# Patient Record
Sex: FEMALE | Race: WHITE | NOT HISPANIC OR LATINO | Employment: FULL TIME | URBAN - METROPOLITAN AREA
[De-identification: names, ages, dates, MRNs, and addresses within clinical notes are randomized per-mention and may not be internally consistent; named-entity substitution may affect disease eponyms.]

---

## 2023-05-08 ENCOUNTER — HOSPITAL ENCOUNTER (OUTPATIENT)
Dept: DATA CONVERSION | Facility: HOSPITAL | Age: 28
End: 2023-05-08
Attending: SURGERY | Admitting: SURGERY
Payer: COMMERCIAL

## 2023-05-08 DIAGNOSIS — E66.9 OBESITY, UNSPECIFIED: ICD-10-CM

## 2023-05-08 DIAGNOSIS — F17.290 NICOTINE DEPENDENCE, OTHER TOBACCO PRODUCT, UNCOMPLICATED: ICD-10-CM

## 2023-05-08 DIAGNOSIS — K64.4 RESIDUAL HEMORRHOIDAL SKIN TAGS: ICD-10-CM

## 2023-05-08 DIAGNOSIS — K60.3 ANAL FISTULA: ICD-10-CM

## 2023-05-08 DIAGNOSIS — Z88.0 ALLERGY STATUS TO PENICILLIN: ICD-10-CM

## 2023-10-02 NOTE — OP NOTE
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 The patient was brought to the operating room and placed supine. Sequential compression devices were placed. Huddle was completed in accordance with hospital procedures.  Anesthesia was induced and LMA was placed. The patient was placed in lithotomy with all pressure points padded. The area was prepped and draped in the usual fashion. Time out was completed. Digital rectal exam was performed. Pudendal nerve block was performed  with 1/4% marcaine with epinephrine. Intersphincteric block  was also performed. A total of 30cc of local anesthesia was utilized.    The anal sphincter was normal. There were large external hemorrhoids on the right anterior and posterior positions. There were small skin tags on the left lateral position. The seton was in place on the right anterior. There was predominantly subcutaneous  tissue and a few fibers of the internal sphincter. These were divided with electrocautery and the seton removed. The edges were saucerized.     The patient was then returned to supine and awakened. The patient was transferred to PACU awake and in stable conditions. Counts were reported correct  at the end of the procedure. I was present and scrubbed throughout.                        Attestation:   Note Completion:  Attending Attestation I  was present for the entire procedure    I am a: Resident/Fellow         Electronic Signatures:  Stefani Aleman (Fellow))  (Signed 08-May-2023 13:20)   Authored: Post-Operative Note, Chart Review, Note Completion  Latosha Rosen)  (Signed 09-May-2023 13:07)   Authored: Post-Operative Note, Chart Review, Note Completion   Co-Signer: Post-Operative Note, Chart Review, Note Completion      Last Updated: 09-May-2023 13:07 by Latosha Rosen)

## 2023-10-12 ENCOUNTER — HOSPITAL ENCOUNTER (EMERGENCY)
Facility: HOSPITAL | Age: 28
Discharge: HOME | End: 2023-10-12
Attending: EMERGENCY MEDICINE
Payer: COMMERCIAL

## 2023-10-12 VITALS
BODY MASS INDEX: 33.66 KG/M2 | OXYGEN SATURATION: 97 % | DIASTOLIC BLOOD PRESSURE: 74 MMHG | WEIGHT: 190 LBS | SYSTOLIC BLOOD PRESSURE: 112 MMHG | HEART RATE: 81 BPM | HEIGHT: 63 IN | TEMPERATURE: 98.6 F | RESPIRATION RATE: 18 BRPM

## 2023-10-12 DIAGNOSIS — B34.9 VIRAL ILLNESS: ICD-10-CM

## 2023-10-12 DIAGNOSIS — R55 VASOVAGAL SYNCOPE: Primary | ICD-10-CM

## 2023-10-12 LAB
ALBUMIN SERPL BCP-MCNC: 4.6 G/DL (ref 3.4–5)
ALP SERPL-CCNC: 77 U/L (ref 33–110)
ALT SERPL W P-5'-P-CCNC: 21 U/L (ref 7–45)
ANION GAP BLDV CALCULATED.4IONS-SCNC: 6 MMOL/L (ref 10–25)
ANION GAP SERPL CALC-SCNC: 11 MMOL/L (ref 10–20)
APPEARANCE UR: ABNORMAL
AST SERPL W P-5'-P-CCNC: 20 U/L (ref 9–39)
BASE EXCESS BLDV CALC-SCNC: 2.4 MMOL/L (ref -2–3)
BASOPHILS # BLD AUTO: 0.06 X10*3/UL (ref 0–0.1)
BASOPHILS NFR BLD AUTO: 0.5 %
BILIRUB DIRECT SERPL-MCNC: 0.1 MG/DL (ref 0–0.3)
BILIRUB SERPL-MCNC: 0.5 MG/DL (ref 0–1.2)
BILIRUB UR STRIP.AUTO-MCNC: NEGATIVE MG/DL
BODY TEMPERATURE: 37 DEGREES CELSIUS
BUN SERPL-MCNC: 11 MG/DL (ref 6–23)
CA-I BLDV-SCNC: 1.22 MMOL/L (ref 1.1–1.33)
CALCIUM SERPL-MCNC: 8.9 MG/DL (ref 8.6–10.3)
CHLORIDE BLDV-SCNC: 105 MMOL/L (ref 98–107)
CHLORIDE SERPL-SCNC: 105 MMOL/L (ref 98–107)
CO2 SERPL-SCNC: 25 MMOL/L (ref 21–32)
COLOR UR: YELLOW
CREAT SERPL-MCNC: 0.77 MG/DL (ref 0.5–1.05)
EOSINOPHIL # BLD AUTO: 0.06 X10*3/UL (ref 0–0.7)
EOSINOPHIL NFR BLD AUTO: 0.5 %
ERYTHROCYTE [DISTWIDTH] IN BLOOD BY AUTOMATED COUNT: 13.2 % (ref 11.5–14.5)
GFR SERPL CREATININE-BSD FRML MDRD: >90 ML/MIN/1.73M*2
GLUCOSE BLDV-MCNC: 97 MG/DL (ref 74–99)
GLUCOSE SERPL-MCNC: 89 MG/DL (ref 74–99)
GLUCOSE UR STRIP.AUTO-MCNC: NEGATIVE MG/DL
HCG UR QL IA.RAPID: NEGATIVE
HCO3 BLDV-SCNC: 28.8 MMOL/L (ref 22–26)
HCT VFR BLD AUTO: 39.7 % (ref 36–46)
HCT VFR BLD EST: 41 % (ref 36–46)
HGB BLD-MCNC: 12.8 G/DL (ref 12–16)
HGB BLDV-MCNC: 13.5 G/DL (ref 12–16)
HOLD SPECIMEN: NORMAL
IMM GRANULOCYTES # BLD AUTO: 0.05 X10*3/UL (ref 0–0.7)
IMM GRANULOCYTES NFR BLD AUTO: 0.4 % (ref 0–0.9)
INHALED O2 CONCENTRATION: 0 %
KETONES UR STRIP.AUTO-MCNC: NEGATIVE MG/DL
LACTATE BLDV-SCNC: 0.7 MMOL/L (ref 0.4–2)
LEUKOCYTE ESTERASE UR QL STRIP.AUTO: NEGATIVE
LYMPHOCYTES # BLD AUTO: 1.11 X10*3/UL (ref 1.2–4.8)
LYMPHOCYTES NFR BLD AUTO: 9.9 %
MCH RBC QN AUTO: 28.7 PG (ref 26–34)
MCHC RBC AUTO-ENTMCNC: 32.2 G/DL (ref 32–36)
MCV RBC AUTO: 89 FL (ref 80–100)
MONOCYTES # BLD AUTO: 0.98 X10*3/UL (ref 0.1–1)
MONOCYTES NFR BLD AUTO: 8.7 %
NEUTROPHILS # BLD AUTO: 8.97 X10*3/UL (ref 1.2–7.7)
NEUTROPHILS NFR BLD AUTO: 80 %
NITRITE UR QL STRIP.AUTO: NEGATIVE
NRBC BLD-RTO: 0 /100 WBCS (ref 0–0)
OXYHGB MFR BLDV: 61 % (ref 45–75)
PCO2 BLDV: 51 MM HG (ref 41–51)
PH BLDV: 7.36 PH (ref 7.33–7.43)
PH UR STRIP.AUTO: 6 [PH]
PLATELET # BLD AUTO: 249 X10*3/UL (ref 150–450)
PMV BLD AUTO: 11.3 FL (ref 7.5–11.5)
PO2 BLDV: 38 MM HG (ref 35–45)
POTASSIUM BLDV-SCNC: 4.1 MMOL/L (ref 3.5–5.3)
POTASSIUM SERPL-SCNC: 3.9 MMOL/L (ref 3.5–5.3)
PROT SERPL-MCNC: 7.7 G/DL (ref 6.4–8.2)
PROT UR STRIP.AUTO-MCNC: NEGATIVE MG/DL
RBC # BLD AUTO: 4.46 X10*6/UL (ref 4–5.2)
RBC # UR STRIP.AUTO: NEGATIVE /UL
S PYO DNA THROAT QL NAA+PROBE: NOT DETECTED
SAO2 % BLDV: 62 % (ref 45–75)
SARS-COV-2 RNA RESP QL NAA+PROBE: NOT DETECTED
SODIUM BLDV-SCNC: 136 MMOL/L (ref 136–145)
SODIUM SERPL-SCNC: 137 MMOL/L (ref 136–145)
SP GR UR STRIP.AUTO: 1.02
UROBILINOGEN UR STRIP.AUTO-MCNC: <2 MG/DL
WBC # BLD AUTO: 11.2 X10*3/UL (ref 4.4–11.3)

## 2023-10-12 PROCEDURE — 84075 ASSAY ALKALINE PHOSPHATASE: CPT | Performed by: EMERGENCY MEDICINE

## 2023-10-12 PROCEDURE — 87651 STREP A DNA AMP PROBE: CPT | Performed by: EMERGENCY MEDICINE

## 2023-10-12 PROCEDURE — 36415 COLL VENOUS BLD VENIPUNCTURE: CPT | Performed by: EMERGENCY MEDICINE

## 2023-10-12 PROCEDURE — 84295 ASSAY OF SERUM SODIUM: CPT | Mod: 59 | Performed by: EMERGENCY MEDICINE

## 2023-10-12 PROCEDURE — 82435 ASSAY OF BLOOD CHLORIDE: CPT | Performed by: EMERGENCY MEDICINE

## 2023-10-12 PROCEDURE — 85025 COMPLETE CBC W/AUTO DIFF WBC: CPT | Performed by: EMERGENCY MEDICINE

## 2023-10-12 PROCEDURE — 82947 ASSAY GLUCOSE BLOOD QUANT: CPT | Performed by: EMERGENCY MEDICINE

## 2023-10-12 PROCEDURE — 96360 HYDRATION IV INFUSION INIT: CPT

## 2023-10-12 PROCEDURE — 81025 URINE PREGNANCY TEST: CPT | Performed by: EMERGENCY MEDICINE

## 2023-10-12 PROCEDURE — 82248 BILIRUBIN DIRECT: CPT | Performed by: EMERGENCY MEDICINE

## 2023-10-12 PROCEDURE — 99284 EMERGENCY DEPT VISIT MOD MDM: CPT | Performed by: EMERGENCY MEDICINE

## 2023-10-12 PROCEDURE — 81003 URINALYSIS AUTO W/O SCOPE: CPT | Performed by: EMERGENCY MEDICINE

## 2023-10-12 PROCEDURE — 96361 HYDRATE IV INFUSION ADD-ON: CPT

## 2023-10-12 PROCEDURE — 2580000001 HC RX 258 IV SOLUTIONS: Performed by: EMERGENCY MEDICINE

## 2023-10-12 PROCEDURE — 87635 SARS-COV-2 COVID-19 AMP PRB: CPT | Performed by: EMERGENCY MEDICINE

## 2023-10-12 RX ADMIN — SODIUM CHLORIDE, SODIUM LACTATE, POTASSIUM CHLORIDE, AND CALCIUM CHLORIDE 1000 ML: 600; 310; 30; 20 INJECTION, SOLUTION INTRAVENOUS at 15:55

## 2023-10-12 ASSESSMENT — COLUMBIA-SUICIDE SEVERITY RATING SCALE - C-SSRS
1. IN THE PAST MONTH, HAVE YOU WISHED YOU WERE DEAD OR WISHED YOU COULD GO TO SLEEP AND NOT WAKE UP?: NO
2. HAVE YOU ACTUALLY HAD ANY THOUGHTS OF KILLING YOURSELF?: NO
6. HAVE YOU EVER DONE ANYTHING, STARTED TO DO ANYTHING, OR PREPARED TO DO ANYTHING TO END YOUR LIFE?: NO

## 2023-10-12 NOTE — Clinical Note
Maria Esther Villegas was seen and treated in our emergency department on 10/12/2023.  She may return to work on 10/16/2023.       If you have any questions or concerns, please don't hesitate to call.      Danica Funk MD

## 2023-10-12 NOTE — ED PROVIDER NOTES
HPI   Chief Complaint   Patient presents with    Syncope    Flu Symptoms       HPI: []  Currently white female with no medical history comes in with syncope.  She said for last 48 hours she is been experiencing a viral illness with a sore throat stuffy nose runny nose cough congestion and generalized body send myalgias she went to urgent care/[facility] for evaluation strep was negative and while being evaluated while sitting on the chair she had a brief syncopal episode.  She did not fall down did not hit her head she had no incontinence no seizures.  Currently back to baseline.  Denies any chest pain pressure heaviness denies shortness breath no headache.  No vision changes.  No recent travel or hospitalization.  No trauma or falls.  She denies hematemesis melena or hematochezia.  Denies hemoptysis.  Denies chills or rigors.    Past history: None except for recent perianal abscess requiring I&D  Social: Ex tobacco use quit about 2 weeks ago denies a current tobacco alcohol drug abuse.  REVIEW OF SYSTEMS:    GENERAL.: No weight loss, fatigue, anorexia, insomnia, positive for fever.  Positive for myalgias    EYES: No vision loss, double vision, drainage, eye pain.    ENT: Positive for pharyngitis, dry mouth.    CARDIOPULMONARY: No chest pain, palpitations, syncope, near syncope. No shortness of breath, cough, hemoptysis.    GI: No abdominal pain, change in bowel habits, melena, hematemesis, hematochezia, nausea, vomiting, diarrhea.    : No discharge, dysuria, frequency, urgency, hematuria.    MS: No limb pain, joint pain, joint swelling.    SKIN: No rashes.    PSYCH: No depression, anxiety, suicidality, homicidality.    Review of systems is otherwise negative unless stated above or in history of present illness.  Social history, family history, allergies reviewed.  PHYSICAL EXAM:    GENERAL: Vitals noted, no distress. Alert and oriented  x 3. Non-toxic.      EENT: TMs clear. Posterior oropharynx with mild  hyperemia with no exerted uvula midline no PTA no meningismus. No LAD.     NECK: Supple. Nontender. No midline tenderness.     CARDIAC: Regular, rate, rhythm. No murmurs rubs or gallops. No JVD    PULMONARY: Lungs clear bilaterally with good aeration. No wheezes rales or rhonchi. No respiratory distress.  No tachypnea stridor or retractions able to speak in full sentences    ABDOMEN: Soft, nonsurgical. Nontender. No peritoneal signs. Normoactive bowel sounds. No pulsatile masses.  Negative CVA tenderness negative inguinal hernias    EXTREMITIES: No peripheral edema. Negative Homans bilaterally, no cords.  2+ bounding possible perfused    SKIN: No rash. Intact.     NEURO: No focal neurologic deficits, NIH score of 0. Cranial nerves normal as tested from II through XII.  Negative nuchal rigidity negative meningeal signs    MEDICAL DECISION MAKING:  EKG on my interpretation shows a normal sinus rhythm normal axis rate mid 80s with no acute ischemic change.    CBC today shows no leukocytosis chemistries unremarkable LFTs unremarkable UA negative strep negative covid19 negative.    Treatment in ED: Upon arrival IV established, given 1 L of LR.    ED course: Repeat assessment feeling much better remains afebrile normotensive no tachycardia or hypoxia.    Impression: #1 viral illness, #2 syncope most likely vasovagal    Plan/MDM: Young 28year-old female who has a medical history comes in with viral illness for 48 duration and a syncopal episode with patient with vasovagal at this time I suspicion for meningitis encephalitis TIA stroke STEMI NSTEMI or catastrophic malignant arrhythmia is low.  Patient appears well she is afebrile normotensive no negative meningeal signs very benign work-up in the ED.  I do not think she requires any further advanced imaging and or hospitalization.  Patient will be discharged home by supportive care close outpatient follow recommended with strict return precaution.                               No data recorded                Patient History   No past medical history on file.  No past surgical history on file.  No family history on file.  Social History     Tobacco Use    Smoking status: Not on file    Smokeless tobacco: Not on file   Substance Use Topics    Alcohol use: Not on file    Drug use: Not on file       Physical Exam   ED Triage Vitals [10/12/23 1542]   Temp Heart Rate Resp BP   37 °C (98.6 °F) 84 16 117/78      SpO2 Temp Source Heart Rate Source Patient Position   -- Oral Monitor --      BP Location FiO2 (%)     -- --       Physical Exam    ED Course & [facility]   ED Course as of 10/12/23 1909   Thu Oct 12, 2023   1909 On repeat assessment patient feeling remarkably better she is afebrile normotensive no tachycardia or hypoxia.  Nonfocal examination.  Negative meningeal signs.  Patient most likely is a viral illness and she had a vasovagal syncope.  Patient reassured advised close outpatient follow-up adequate hydration with strict return precautions. [MT]      ED Course User Index  [MT] Danica Funk MD         Diagnoses as of 10/12/23 1909   Vasovagal syncope   Viral illness       Medical Decision Making      Procedure  Procedures     Danica Funk MD  10/12/23 1912

## 2023-10-12 NOTE — ED TRIAGE NOTES
Pt was at urgent care for flu-like symptoms and while at Urgent care pt had a syncopal episode that lasted a min.

## 2025-05-04 ENCOUNTER — OFFICE VISIT (OUTPATIENT)
Dept: URGENT CARE | Age: 30
End: 2025-05-04
Payer: COMMERCIAL

## 2025-05-04 VITALS
TEMPERATURE: 98.4 F | SYSTOLIC BLOOD PRESSURE: 144 MMHG | BODY MASS INDEX: 40.18 KG/M2 | HEART RATE: 76 BPM | WEIGHT: 250 LBS | RESPIRATION RATE: 18 BRPM | HEIGHT: 66 IN | DIASTOLIC BLOOD PRESSURE: 76 MMHG | OXYGEN SATURATION: 98 %

## 2025-05-04 DIAGNOSIS — J01.90 ACUTE NON-RECURRENT SINUSITIS, UNSPECIFIED LOCATION: Primary | ICD-10-CM

## 2025-05-04 DIAGNOSIS — R68.89 FLU-LIKE SYMPTOMS: ICD-10-CM

## 2025-05-04 LAB
POC CORONAVIRUS SARS-COV-2 PCR: NEGATIVE
POC HUMAN RHINOVIRUS PCR: NEGATIVE
POC INFLUENZA A VIRUS PCR: NEGATIVE
POC INFLUENZA B VIRUS PCR: NEGATIVE
POC RESPIRATORY SYNCYTIAL VIRUS PCR: NEGATIVE

## 2025-05-04 PROCEDURE — 99213 OFFICE O/P EST LOW 20 MIN: CPT | Performed by: STUDENT IN AN ORGANIZED HEALTH CARE EDUCATION/TRAINING PROGRAM

## 2025-05-04 PROCEDURE — 1036F TOBACCO NON-USER: CPT | Performed by: STUDENT IN AN ORGANIZED HEALTH CARE EDUCATION/TRAINING PROGRAM

## 2025-05-04 PROCEDURE — 87631 RESP VIRUS 3-5 TARGETS: CPT | Performed by: STUDENT IN AN ORGANIZED HEALTH CARE EDUCATION/TRAINING PROGRAM

## 2025-05-04 PROCEDURE — 3008F BODY MASS INDEX DOCD: CPT | Performed by: STUDENT IN AN ORGANIZED HEALTH CARE EDUCATION/TRAINING PROGRAM

## 2025-05-04 RX ORDER — DOXYCYCLINE 100 MG/1
100 CAPSULE ORAL 2 TIMES DAILY
Qty: 14 CAPSULE | Refills: 0 | Status: SHIPPED | OUTPATIENT
Start: 2025-05-04 | End: 2025-05-11

## 2025-05-04 ASSESSMENT — ENCOUNTER SYMPTOMS
COUGH: 1
HEADACHES: 1

## 2025-05-04 NOTE — PATIENT INSTRUCTIONS
PROBIOTICS:  I recommend starting two different probiotics. Probiotics can help replenish beneficial gut bacteria that antibiotics often destroy, reducing the risk of digestive issues that antibiotics may cause such as diarrhea, bloating and C. difficle intestinal infection. You can typically find these at most [employer], [employer], [employer] or on [employer]  1) One containing Lactobacillus Rhamnosus GG. This can help reduce antibiotic associated diarrhea. Ex: Culturelle  2) One containing Saccharomyces Boulardii. This can help prevent antibiotic associated C.difficle intestinal infection. Ex: Florastor  Helpful tips:  *Take probiotic at least 2 hours before or after the antibiotic to prevent the antibiotic from killing the good bacteria  *Continue the probiotic for 1-2 weeks after finishing the antibiotic to help restore gut health  *Include probiotic rich foods like Greek yogurt, kefir, sauerkraut or kimchi for added benefits     sinus supportive care:     Mucinex D with pseudoephedrine can be helpful for congestion.     I advise rest, fluids, Flonase nasal spray once daily, nasal saline rinses 2-3 times daily, humidifier in bedroom at night, Claritin or Zyrtec once daily for the next two weeks to help with post nasal drip and/or feeling of fullness in the ears from congestion.    Dissolving 1 tsp of salt into warm water and gargling as needed can be helpful for sore throat. Honey can help to coat and soothe the throat as well. Honey is also an excellent cough suppressant. You can swallow 1 tsp honey plain or dissolve it into warm tea/liquid as needed.     Please follow up with your primary provider, go to the emergency room, or return to urgent care if symptoms do not improve or worsen.  You may schedule an appointment online at [email or web address] or call [phone]. Go to the Emergency Department if symptoms significantly worsen or if you develop symptoms including but not limited to chest pain or  shortness of breath.

## 2025-05-04 NOTE — PROGRESS NOTES
"Subjective   Patient ID: Maria Esther Villegas is a 29 y.o. female. They present today with a chief complaint of Cough (Cold sx for 8 days, started with a sore throat, headache, congestion, cough and earache both ears).    History of Present Illness  Pt presents with illness x 8 days. Sx include cough, nasal and chest congestion, headache, s/t (resolved), b/l ear pain. Her co-workers have been sick, states one dx with bronchitis and other dx with pneumonia. She is taking tylenol and zicam for sx. Felt fever-araseli/chills 4 days ago, none since. No pmhx. Uses vape pen. Denies continued fever, chills, cp, sob.       History provided by:  Patient  Cough  Associated symptoms include ear pain and headaches.       Past Medical History  Allergies as of 05/04/2025 - Reviewed 05/04/2025   Allergen Reaction Noted    Amoxicillin Rash 06/30/2023       Prescriptions Prior to Admission[1]     Medical History[2]    Surgical History[3]     reports that she has never smoked. She has never used smokeless tobacco. She reports that she does not drink alcohol and does not use drugs.    Review of Systems  Review of Systems   HENT:  Positive for congestion and ear pain.    Respiratory:  Positive for cough.    Neurological:  Positive for headaches.   All other systems reviewed and are negative.                                 Objective    Vitals:    05/04/25 1415   BP: 144/76   BP Location: Left arm   Patient Position: Sitting   BP Cuff Size: Large adult   Pulse: 76   Resp: 18   Temp: 36.9 °C (98.4 °F)   TempSrc: Oral   SpO2: 98%   Weight: 113 kg (250 lb)   Height: 1.676 m (5' 6\")     Patient's last menstrual period was 04/14/2025 (exact date).    Physical Exam  Vitals and nursing note reviewed.   Constitutional:       General: She is not in acute distress.     Appearance: Normal appearance. She is not ill-appearing, toxic-appearing or diaphoretic.   HENT:      Head: Normocephalic and atraumatic.      Right Ear: Tympanic membrane, ear canal and " external ear normal.      Left Ear: Tympanic membrane, ear canal and external ear normal.      Nose: Nose normal.      Right Nostril: No foreign body, epistaxis, septal hematoma or occlusion.      Left Nostril: No foreign body, epistaxis, septal hematoma or occlusion.      Right Turbinates: Not enlarged, swollen or pale.      Left Turbinates: Not enlarged, swollen or pale.      Right Sinus: No maxillary sinus tenderness or frontal sinus tenderness.      Left Sinus: No maxillary sinus tenderness or frontal sinus tenderness.      Mouth/Throat:      Lips: Pink.      Mouth: Mucous membranes are moist.      Pharynx: Oropharynx is clear. Uvula midline. No pharyngeal swelling, oropharyngeal exudate, posterior oropharyngeal erythema, uvula swelling or postnasal drip.      Tonsils: No tonsillar exudate or tonsillar abscesses.   Cardiovascular:      Rate and Rhythm: Normal rate and regular rhythm.      Pulses: Normal pulses.      Heart sounds: No murmur heard.  Pulmonary:      Effort: Pulmonary effort is normal. No respiratory distress.      Breath sounds: No wheezing, rhonchi or rales.   Neurological:      Mental Status: She is alert.         Procedures    Point of Care Test & Imaging Results from this visit  Results for orders placed or performed in visit on 05/04/25   POCT SP[facility]FIRE R[facility]ST [facility] manually resulted    Specimen: Swab   Result Value Ref Range    POC Sars-Cov-2 PCR Negative Negative    POC Respiratory Syncytial Virus PCR Negative Negative    POC Influenza A Virus PCR Negative Negative    POC Influenza B Virus PCR Negative Negative    POC Human Rhinovirus PCR Negative Negative      Imaging  No results found.    Cardiology, Vascular, and Other Imaging  No other imaging results found for the past 2 days      Diagnostic study results (if any) were reviewed by Carolina Hernandez PA-C.    Assessment/Plan   Allergies, medications, history, and pertinent labs/EKGs/Imaging reviewed by Carolina COSTA  MYESHA Hernandez.     Medical Decision Making  Lower concern for pneumonia  Advised supportive care  Return, follow up with pcp as needed    Orders and Diagnoses  Diagnoses and all orders for this visit:  Acute non-recurrent sinusitis, unspecified location  -     doxycycline (Vibramycin) 100 mg capsule; Take 1 capsule (100 mg) by mouth 2 times a day for 7 days. Take with at least 8 ounces (large glass) of water, do not lie down for 30 minutes after. Avoid the sun. Discard the additional 6 tablets remaining in bottle when finished with script.  Flu-like symptoms  -     POCT SPOTFIRE R/ST Panel Mini w/COVID ([facility]) manually resulted      Medical Admin Record      Patient disposition: Home    Electronically signed by Carolina Hernandez PA-C  2:43 PM           [1] (Not in a hospital admission)   [2] No past medical history on file.  [3] No past surgical history on file.